# Patient Record
Sex: FEMALE | Race: WHITE | NOT HISPANIC OR LATINO | Employment: FULL TIME | URBAN - NONMETROPOLITAN AREA
[De-identification: names, ages, dates, MRNs, and addresses within clinical notes are randomized per-mention and may not be internally consistent; named-entity substitution may affect disease eponyms.]

---

## 2018-06-25 ENCOUNTER — OFFICE VISIT (OUTPATIENT)
Dept: OBSTETRICS AND GYNECOLOGY | Facility: CLINIC | Age: 28
End: 2018-06-25

## 2018-06-25 VITALS
DIASTOLIC BLOOD PRESSURE: 72 MMHG | BODY MASS INDEX: 25.52 KG/M2 | SYSTOLIC BLOOD PRESSURE: 116 MMHG | HEIGHT: 67 IN | WEIGHT: 162.6 LBS

## 2018-06-25 DIAGNOSIS — F52.0 HYPOACTIVE SEXUAL DESIRE: ICD-10-CM

## 2018-06-25 DIAGNOSIS — N94.10 DYSPAREUNIA, FEMALE: Primary | ICD-10-CM

## 2018-06-25 PROCEDURE — 99203 OFFICE O/P NEW LOW 30 MIN: CPT | Performed by: PHYSICIAN ASSISTANT

## 2018-06-25 RX ORDER — FEXOFENADINE HYDROCHLORIDE 60 MG/1
TABLET, FILM COATED ORAL
COMMUNITY
Start: 2018-06-01

## 2018-06-25 NOTE — PROGRESS NOTES
Subjective   Chief Complaint   Patient presents with   • Dyspareunia     Has always had painful intercourse; decreased libido       Ksaey Mckoy is a 27 y.o. year old new patient  presenting to be seen for complaint of dyspareunia and decreased libido.  She has been  for several months. Her first sexual activity was at age 25 with her now .  She reports having dyspareunia since her first intercourse. Also feels lack of sexual desire.  [place] painful every time with insertion and not usually able to continue intercourse. Has not had any vaginal discharge or irritation and does not feel vaginal dryness an issue.  Denies any history of sexual abuse or trauma in the past. She admits she does not really feel sexually attracted to her . Doesn't feel she has ever felt sexually attracted to any male or female.   Started sprintec for contraception 2017. Doesn't feels oc's have contributed to decreased libido.        History reviewed. No pertinent past medical history.     Current Outpatient Prescriptions:   •  fexofenadine (ALLEGRA) 60 MG tablet, , Disp: , Rfl:   •  SPRINTEC 28 0.25-35 MG-MCG per tablet, , Disp: , Rfl:    Allergies   Allergen Reactions   • Azithromycin Nausea And Vomiting      Past Surgical History:   Procedure Laterality Date   • NO PAST SURGERIES        Social History     Social History   • Marital status:      Spouse name: N/A   • Number of children: N/A   • Years of education: N/A     Occupational History   • Not on file.     Social History Main Topics   • Smoking status: Never Smoker   • Smokeless tobacco: Never Used   • Alcohol use Yes      Comment: 5 per week   • Drug use: No   • Sexual activity: Yes     Partners: Male     Birth control/ protection: OCP     Other Topics Concern   • Not on file     Social History Narrative   • No narrative on file      Family History   Problem Relation Age of Onset   • Diabetes Father    • No Known Problems Mother    •  "Breast cancer Maternal Grandmother    • Meniere's disease Maternal Grandmother    • Hyperlipidemia Maternal Grandmother    • Diabetes Maternal Grandfather    • Cystic fibrosis Maternal Grandfather        Review of Systems   Constitutional: Negative.    HENT: Positive for sinus pressure.    Respiratory: Positive for cough.    Gastrointestinal: Negative.    Genitourinary: Positive for dyspareunia. Negative for dysuria, menstrual problem, pelvic pain and vaginal discharge.   All other systems reviewed and are negative.          Objective   /72   Ht 170.2 cm (67\")   Wt 73.8 kg (162 lb 9.6 oz)   LMP 06/16/2018 (Exact Date)   Breastfeeding? No   BMI 25.47 kg/m²     Physical Exam   Constitutional: She appears well-developed and well-nourished. She is cooperative.   Neck: No thyroid mass and no thyromegaly present.   Cardiovascular: Normal rate, regular rhythm and normal heart sounds.    Pulmonary/Chest: Effort normal and breath sounds normal.   Abdominal: Soft. Normal appearance. There is no tenderness.   Genitourinary: Uterus normal. There is no tenderness or lesion on the right labia. There is no tenderness or lesion on the left labia. Cervix exhibits no motion tenderness and no discharge. Right adnexum displays no mass and no tenderness. Left adnexum displays no mass and no tenderness. No erythema in the vagina. No vaginal discharge found.   Genitourinary Comments: Very difficult for patient to relax for exam. Hymenal ring is thick posteriorly however when patient able to relax there is no difficulty inserting speculum. No vaginal bands or abnormalities noted.   Neurological: She is alert.   Skin: Skin is warm and dry.   Psychiatric: She has a normal mood and affect. Her behavior is normal.            Assessment and Plan  Kasey was seen today for dyspareunia.    Diagnoses and all orders for this visit:    Dyspareunia, female    Hypoactive sexual desire      Patient Instructions   Patient informed that no " physical abnormalities noted except thick posterior vaginal band. While this could be contributing to her dyspareunia when she is able to fully relax I feel she could have successful intercourse. However there could be option to consider surgical relaxation of hymenal band but do not feel that would be next step at this time. Patient counseled in techniques for conscious relaxation of hymenal band and vaginal muscles with sexual activity and also can consider using vaginal dilators to aid with relaxation for next several weeks.  Given her other complaints of not ever feeling sexual desire I feel she would benefit from counseling specific to sexual issues and she is open to this idea. She is informed I will research counselors in [facility] specific to this area and contact her with options.  It is also discussed that it would be appropriate to check hormone levels however she would have to discontinue her oc;s for several weeks and she prefers not to go this route at this time.       30 minutes total time spent face to face with patient with 20 minutes specific to counseling patient regarding above.             This note was electronically signed.    Jerrica Lovelace PA-C   June 26, 2018

## 2018-06-26 NOTE — PATIENT INSTRUCTIONS
Patient informed that no physical abnormalities noted except thick posterior vaginal band. While this could be contributing to her dyspareunia when she is able to fully relax I feel she could have successful intercourse. However there could be option to consider surgical relaxation of hymenal band but do not feel that would be next step at this time. Patient counseled in techniques for conscious relaxation of hymenal band and vaginal muscles with sexual activity and also can consider using vaginal dilators to aid with relaxation for next several weeks.  Given her other complaints of not ever feeling sexual desire I feel she would benefit from counseling specific to sexual issues and she is open to this idea. She is informed I will research counselors in [facility] specific to this area and contact her with options.  It is also discussed that it would be appropriate to check hormone levels however she would have to discontinue her oc;s for several weeks and she prefers not to go this route at this time.       30 minutes total time spent face to face with patient with 20 minutes specific to counseling patient regarding above.

## 2018-12-03 ENCOUNTER — TRANSCRIBE ORDERS (OUTPATIENT)
Dept: ADMINISTRATIVE | Facility: HOSPITAL | Age: 28
End: 2018-12-03

## 2018-12-03 ENCOUNTER — HOSPITAL ENCOUNTER (OUTPATIENT)
Dept: GENERAL RADIOLOGY | Facility: HOSPITAL | Age: 28
Discharge: HOME | End: 2018-12-03
Admitting: NURSE PRACTITIONER

## 2018-12-03 DIAGNOSIS — R05.9 COUGH: Primary | ICD-10-CM

## 2018-12-03 PROCEDURE — 71046 X-RAY EXAM CHEST 2 VIEWS: CPT

## 2021-01-04 ENCOUNTER — IMMUNIZATION (OUTPATIENT)
Dept: VACCINE CLINIC | Facility: HOSPITAL | Age: 31
End: 2021-01-04

## 2021-01-04 PROCEDURE — 91301 HC SARSCO02 VAC 100MCG/0.5ML IM: CPT | Performed by: INTERNAL MEDICINE

## 2021-01-04 PROCEDURE — 0011A: CPT | Performed by: INTERNAL MEDICINE

## 2021-02-01 ENCOUNTER — IMMUNIZATION (OUTPATIENT)
Dept: VACCINE CLINIC | Facility: HOSPITAL | Age: 31
End: 2021-02-01

## 2021-02-01 PROCEDURE — 0012A: CPT | Performed by: INTERNAL MEDICINE

## 2021-02-01 PROCEDURE — 91301 HC SARSCO02 VAC 100MCG/0.5ML IM: CPT | Performed by: INTERNAL MEDICINE

## 2021-08-06 ENCOUNTER — HOSPITAL ENCOUNTER (OUTPATIENT)
Dept: GENERAL RADIOLOGY | Facility: HOSPITAL | Age: 31
Discharge: HOME | End: 2021-08-06
Admitting: NURSE PRACTITIONER

## 2021-08-06 ENCOUNTER — TRANSCRIBE ORDERS (OUTPATIENT)
Dept: GENERAL RADIOLOGY | Facility: HOSPITAL | Age: 31
End: 2021-08-06

## 2021-08-06 DIAGNOSIS — M25.531 RIGHT WRIST PAIN: Primary | ICD-10-CM

## 2021-08-06 DIAGNOSIS — M25.531 RIGHT WRIST PAIN: ICD-10-CM

## 2021-08-06 PROCEDURE — 73100 X-RAY EXAM OF WRIST: CPT

## 2023-08-22 ENCOUNTER — OFFICE VISIT (OUTPATIENT)
Dept: OBSTETRICS AND GYNECOLOGY | Facility: CLINIC | Age: 33
End: 2023-08-22
Payer: COMMERCIAL

## 2023-08-22 VITALS
SYSTOLIC BLOOD PRESSURE: 112 MMHG | WEIGHT: 165.6 LBS | BODY MASS INDEX: 25.99 KG/M2 | HEIGHT: 67 IN | DIASTOLIC BLOOD PRESSURE: 72 MMHG

## 2023-08-22 DIAGNOSIS — N94.10 DYSPAREUNIA IN FEMALE: Primary | ICD-10-CM

## 2023-08-22 PROCEDURE — 99213 OFFICE O/P EST LOW 20 MIN: CPT | Performed by: PHYSICIAN ASSISTANT

## 2023-08-22 NOTE — PATIENT INSTRUCTIONS
Encouraged to try and use estrogen cream twice weekly. Will continue current management as dyspareunia improved  Follow up prn

## 2023-08-22 NOTE — PROGRESS NOTES
Subjective   Chief Complaint   Patient presents with    Follow-up     2 month follow-up dyspareunia, patient states she has had some improvement       Kasey Leelee is a 33 y.o. year old  presenting to be seen for follow up  Seen 2 months ago for vaginal dyspareunia. Had yeast infection which was treated. Also started vaginal estrogen cream as vaginal tissue appeared atrophic  Patient reports seeing quite a bit of improvement but she is not always consistent with using cream twice weekly.  No new complaints or concerns    History reviewed. No pertinent past medical history.     Current Outpatient Medications:     buPROPion XL (WELLBUTRIN XL) 300 MG 24 hr tablet, , Disp: , Rfl:     estradiol (ESTRACE VAGINAL) 0.1 MG/GM vaginal cream, Insert 1 gm intravaginally 2 times each week, Disp: 42.5 g, Rfl: 2    fexofenadine (ALLEGRA) 60 MG tablet, , Disp: , Rfl:     Xulane 150-35 MCG/24HR, , Disp: , Rfl:    Allergies   Allergen Reactions    Azithromycin Nausea And Vomiting      Past Surgical History:   Procedure Laterality Date    NO PAST SURGERIES        Social History     Socioeconomic History    Marital status:    Tobacco Use    Smoking status: Never    Smokeless tobacco: Never   Substance and Sexual Activity    Alcohol use: Not Currently     Comment: 5 per week    Drug use: No    Sexual activity: Yes     Partners: Male     Birth control/protection: Condom, Patch      Family History   Problem Relation Age of Onset    Diabetes Father     No Known Problems Mother     Breast cancer Maternal Grandmother     Meniere's disease Maternal Grandmother     Hyperlipidemia Maternal Grandmother     Diabetes Maternal Grandfather     Cystic fibrosis Maternal Grandfather        Review of Systems   Constitutional:  Negative for chills, diaphoresis and fever.   Gastrointestinal:  Negative for constipation, diarrhea, nausea and vomiting.   Genitourinary:  Negative for difficulty urinating, dysuria, menstrual problem and pelvic  "pain.         Objective   /72   Ht 170.2 cm (67\")   Wt 75.1 kg (165 lb 9.6 oz)   LMP 07/25/2023 (Approximate)   BMI 25.94 kg/mý     Physical Exam  Constitutional:       Appearance: Normal appearance. She is well-developed and well-groomed.   Eyes:      General: Lids are normal.      Extraocular Movements: Extraocular movements intact.      Conjunctiva/sclera: Conjunctivae normal.   Neurological:      General: No focal deficit present.      Mental Status: She is alert and oriented to person, place, and time.   Psychiatric:         Attention and Perception: Attention normal.         Mood and Affect: Mood normal.         Speech: Speech normal.         Behavior: Behavior is cooperative.          Result Review :           NuSwab VG+ - Swab, Vagina (06/22/2023 10:36)         Assessment and Plan  Diagnoses and all orders for this visit:    1. Dyspareunia in female (Primary)      Patient Instructions   Encouraged to try and use estrogen cream twice weekly. Will continue current management as dyspareunia improved  Follow up prn           This note was electronically signed.    Jerrica Lovelace PA-C   August 22, 2023  "

## 2024-08-23 ENCOUNTER — OFFICE VISIT (OUTPATIENT)
Dept: NEUROLOGY | Facility: CLINIC | Age: 34
End: 2024-08-23
Payer: COMMERCIAL

## 2024-08-23 VITALS
HEART RATE: 115 BPM | TEMPERATURE: 98.7 F | OXYGEN SATURATION: 95 % | HEIGHT: 67 IN | BODY MASS INDEX: 29 KG/M2 | DIASTOLIC BLOOD PRESSURE: 78 MMHG | SYSTOLIC BLOOD PRESSURE: 128 MMHG | WEIGHT: 184.8 LBS

## 2024-08-23 DIAGNOSIS — R00.0 TACHYCARDIA: ICD-10-CM

## 2024-08-23 DIAGNOSIS — R40.4 TRANSIENT ALTERATION OF AWARENESS: Primary | ICD-10-CM

## 2024-08-23 DIAGNOSIS — R56.9 WITNESSED SEIZURE-LIKE ACTIVITY: ICD-10-CM

## 2024-08-23 PROCEDURE — 99204 OFFICE O/P NEW MOD 45 MIN: CPT | Performed by: NURSE PRACTITIONER

## 2024-08-23 RX ORDER — MONTELUKAST SODIUM 10 MG/1
10 TABLET ORAL NIGHTLY
COMMUNITY

## 2024-08-23 RX ORDER — ATOMOXETINE 40 MG/1
40 CAPSULE ORAL DAILY
COMMUNITY
Start: 2024-02-12

## 2024-08-23 RX ORDER — OLOPATADINE HYDROCHLORIDE AND MOMETASONE FUROATE 25; 665 UG/1; UG/1
SPRAY, METERED NASAL
COMMUNITY
Start: 2024-07-10

## 2024-08-23 RX ORDER — LEVOCETIRIZINE DIHYDROCHLORIDE 5 MG/1
5 TABLET, FILM COATED ORAL EVERY EVENING
COMMUNITY

## 2024-08-23 NOTE — PROGRESS NOTES
"     New Patient Office Visit      Patient Name: Kasey Mckoy  : 1990   MRN: [id number]     Referring Physician: Enma Colunga*    Chief Complaint:    Chief Complaint   Patient presents with    Seizures     NP/Pt is here for seizures. On  patient reports a event that last 5 to 10 seconds of blank stare and reports fatigue for a few days following the event.        History of Present Illness: Kasey Mckoy is a 34 y.o. female who is here today to establish care with Neurology for an episode of transient alteration of awareness.  She is accompanied by her , Josue, today.  She was at home on 2024 and got up to head to bed, from the couch, and she doesn't remember anything after that.  The episode was witnessed by her  and he says she started to stand up and then slumped back into the couch and she had a blank look on her face, arms raised to shoulder height, and then she sat up and came back to and was confused.  She says she remembers her ears ringing and she could hear her  calling for her.  She did bite the inside of her cheek; no incontinence.  States, \"I just felt wrong and a general blah feeling after that\".  She doesn't remember being excessively sleepy that day.  She denies any history of seizures.  Says she has had episodes of passing out previously but but those episodes were not seizure-like.  She denies any known history of head injury.  She graduated from college.  She thinks her birth was normal as far as she knows- full term.  She denies illegal drug use or vaping; drinks alcohol rarely.  She does mention she has noticed she is not able to stand as long as she use to.     Subjective      Review of Systems:   Review of Systems   Neurological:  Positive for syncope.       Past Medical History: History reviewed. No pertinent past medical history.    Past Surgical History:   Past Surgical History:   Procedure Laterality Date    NO PAST SURGERIES         Family " "History:   Family History   Problem Relation Age of Onset    Diabetes Father     No Known Problems Mother     Breast cancer Maternal Grandmother     Meniere's disease Maternal Grandmother     Hyperlipidemia Maternal Grandmother     Diabetes Maternal Grandfather     Cystic fibrosis Maternal Grandfather        Social History:   Social History     Socioeconomic History    Marital status:    Tobacco Use    Smoking status: Never    Smokeless tobacco: Never   Substance and Sexual Activity    Alcohol use: Not Currently     Comment: 5 per week    Drug use: No    Sexual activity: Yes     Partners: Male     Birth control/protection: Condom, Patch       Medications:     Current Outpatient Medications:     atomoxetine (STRATTERA) 40 MG capsule, Take 1 capsule by mouth Daily., Disp: , Rfl:     estradiol (ESTRACE VAGINAL) 0.1 MG/GM vaginal cream, Insert 1 gm intravaginally 2 times each week, Disp: 42.5 g, Rfl: 2    levocetirizine (XYZAL) 5 MG tablet, Take 1 tablet by mouth Every Evening., Disp: , Rfl:     montelukast (SINGULAIR) 10 MG tablet, Take 1 tablet by mouth Every Night., Disp: , Rfl:     Ryaltris 665-25 MCG/ACT suspension, INSTILL 1-2 SPRAYS IN EACH NOSTRIL ONCE OR TWICE DAILY, Disp: , Rfl:     Xulane 150-35 MCG/24HR, , Disp: , Rfl:     Allergies:   Allergies   Allergen Reactions    Azithromycin Nausea And Vomiting       Objective     Physical Exam:  Vital Signs:   Vitals:    08/23/24 1314   BP: 128/78   Pulse: 115   Temp: 98.7 °F (37.1 °C)   SpO2: 95%   Weight: 83.8 kg (184 lb 12.8 oz)   Height: 170.2 cm (67\")   PainSc: 0-No pain     Body mass index is 28.94 kg/m².     Physical Exam  Vitals and nursing note reviewed.   Constitutional:       General: She is not in acute distress.     Appearance: Normal appearance. She is well-developed. She is not diaphoretic.   HENT:      Head: Normocephalic and atraumatic.   Eyes:      Extraocular Movements: Extraocular movements intact.      Conjunctiva/sclera: Conjunctivae " normal.      Pupils: Pupils are equal, round, and reactive to light.   Pulmonary:      Effort: Pulmonary effort is normal. No respiratory distress.   Musculoskeletal:         General: Normal range of motion.   Skin:     General: Skin is warm and dry.   Neurological:      Mental Status: She is alert and oriented to person, place, and time.      Cranial Nerves: Cranial nerves 2-12 are intact.      Coordination: Finger-Nose-Finger Test normal.      Gait: Gait is intact.   Psychiatric:         Mood and Affect: Mood normal.         Behavior: Behavior normal.         Thought Content: Thought content normal.         Judgment: Judgment normal.         Neurologic Exam     Mental Status   Oriented to person, place, and time.   Level of consciousness: alert    Cranial Nerves   Cranial nerves II through XII intact.     CN II   Visual fields full to confrontation.     CN III, IV, VI   Pupils are equal, round, and reactive to light.  CN III: no CN III palsy  CN VI: no CN VI palsy  Nystagmus: none     CN V   Facial sensation intact.     CN VII   Facial expression full, symmetric.     CN IX, X   CN IX normal.   CN X normal.     CN XI   CN XI normal.     CN XII   CN XII normal.     Motor Exam   Muscle bulk: normal  Overall muscle tone: normal    Sensory Exam   Light touch normal.   Proprioception normal.     Gait, Coordination, and Reflexes     Gait  Gait: normal    Coordination   Finger to nose coordination: normal    Tremor   Resting tremor: absent  Intention tremor: absent  Action tremor: absent      Assessment / Plan      Assessment/Plan:   Diagnoses and all orders for this visit:    1. Transient alteration of awareness (Primary)  -     EEG; Future  -     MRI Brain With & Without Contrast; Future  -     Ambulatory Referral to Cardiology    2. Witnessed seizure-like activity  -     EEG; Future  -     MRI Brain With & Without Contrast; Future    3. Tachycardia  -     Ambulatory Referral to Cardiology      *Patient education on  Seizures and Syncope provided today. I have discussed seizure precautions with patient. Advised patient per [place] driving laws- no driving for 90 days following a seizure. 911 should be called for any seizure lasting more than 5 minutes.   *Referral to Cardiology for possible syncopal episodes and tachycardia.       Follow Up:   Return in about 3 months (around 11/23/2024) for Follow Up.    JOSE ALBERTO Main, FNP-C  [facility]

## 2024-08-29 ENCOUNTER — PATIENT ROUNDING (BHMG ONLY) (OUTPATIENT)
Dept: NEUROLOGY | Facility: CLINIC | Age: 34
End: 2024-08-29
Payer: COMMERCIAL

## 2024-08-29 NOTE — PROGRESS NOTES
[facility]     Outpatient New Patient / Consult Note    Kasey Mckoy  [id number]  2024    Referred By: Sonali Griffin*    Chief Complaint   Patient presents with    Seizures    Extremity Weakness       Subjective     History of Present Illness:   Kasey Mckoy is a 34 y.o. female history of significant allergies who presents to the Cardiology Clinic for evaluation of tachycardia and Transient alteration of awareness .  Patient was referred by her neurologist after recently losing consciousness.  Patient says she was sitting on the couch with her , playing on her phone, when she suddenly lost consciousness.  The next thing she remembers is waking up to her , panicked, calling for help.  She reportedly had jerking of her arms and flung her falling off the couch.  She did reportedly bite the inside of her cheek.  She had another episode while standing, cooking in the kitchen where she felt weak and dizzy and felt like she was going to pass out so she sat down and the symptoms eventually resolved.  She did get short of breath but does not report any significant palpitations or chest pain.  She has had at least 3 episodes of syncope in her lifetime but all of them were usually explained by either a very hot environment or poor food/water intake.  She is relatively sedentary at baseline but does some videogame exercises and has had no significant exertional limitation.  Does get a little short of breath but nothing that is unexpected with exercise.  Does not smoke cigarettes or use drugs.  Drinks alcohol socially.    Past Cardiac Testin. Last Coronary Angio: NA  2. Last Echo: NA   3. Prior Stress Testing: NA  4. Prior Holter Monitor: NA    Review of Systems:  Review of Systems   Respiratory: Negative.     Cardiovascular: Negative.    Gastrointestinal: Negative.    Musculoskeletal: Negative.    Neurological:  Positive for dizziness, seizures, syncope and  "weakness. Negative for light-headedness.       No past medical history on file.    Past Surgical History:   Procedure Laterality Date    NO PAST SURGERIES         Family History   Problem Relation Age of Onset    Arrhythmia Mother     Diabetes Father     Breast cancer Maternal Grandmother     Meniere's disease Maternal Grandmother     Hyperlipidemia Maternal Grandmother     Diabetes Maternal Grandfather     Cystic fibrosis Maternal Grandfather     Cardiomyopathy Maternal Grandfather     Atrial fibrillation Maternal Grandfather        Social History     Socioeconomic History    Marital status:    Tobacco Use    Smoking status: Never    Smokeless tobacco: Never   Substance and Sexual Activity    Alcohol use: Not Currently     Comment: 5 per week    Drug use: No    Sexual activity: Yes     Partners: Male     Birth control/protection: Condom, Patch         Current Outpatient Medications:     atomoxetine (STRATTERA) 40 MG capsule, Take 1 capsule by mouth Daily., Disp: , Rfl:     estradiol (ESTRACE VAGINAL) 0.1 MG/GM vaginal cream, Insert 1 gm intravaginally 2 times each week, Disp: 42.5 g, Rfl: 2    levocetirizine (XYZAL) 5 MG tablet, Take 1 tablet by mouth Every Evening., Disp: , Rfl:     montelukast (SINGULAIR) 10 MG tablet, Take 1 tablet by mouth Every Night., Disp: , Rfl:     Ryaltris 665-25 MCG/ACT suspension, prn, Disp: , Rfl:     Xulane 150-35 MCG/24HR, , Disp: , Rfl:     Allergies   Allergen Reactions    Azithromycin Nausea And Vomiting          Objective     Vitals:  Vitals:    08/30/24 0955 08/30/24 1019 08/30/24 1020 08/30/24 1021   BP: 98/64 92/68 94/72 94/78   BP Location: Right arm Right arm Right arm Right arm   Patient Position: Sitting Lying Sitting Standing   Cuff Size: Adult Adult Adult Adult   Pulse: 111 101 112 110   SpO2: 97% 98% 98% 97%   Weight: 82.8 kg (182 lb 9.6 oz)      Height: 170.2 cm (67\")          Physical Exam:  Vitals reviewed.   Constitutional:       Appearance: Healthy " appearance. Not in distress.   Neck:      Vascular: No JVD.   Pulmonary:      Effort: Pulmonary effort is normal.      Breath sounds: Normal breath sounds.   Cardiovascular:      Normal rate. Regular rhythm. Normal S1. Normal S2.       Murmurs: There is no murmur.      No gallop.  No click. No rub.   Pulses:     Intact distal pulses.   Edema:     Peripheral edema absent.   Abdominal:      General: There is no distension.      Palpations: Abdomen is soft.      Tenderness: There is no abdominal tenderness.   Skin:     General: Skin is warm and dry.         Results Review:   I reviewed the patient's new clinical results.  I reviewed the patient's new imaging results and agree with the interpretation.  I reviewed the patient's other test results and agree with the interpretation  I personally viewed and interpreted the patient's EKG/Telemetry data      ECG 12 Lead    Date/Time: 8/30/2024 11:33 AM  Performed by: John Mack MD    Authorized by: John Mack MD  Comparison: not compared with previous ECG   Previous ECG: no previous ECG available  Rhythm: sinus rhythm  Rate: normal  BPM: 95  Conduction: conduction normal  ST Segments: ST segments normal  T Waves: T waves normal  QRS axis: normal  Other: no other findings    Clinical impression: normal ECG             Assessment & Plan   Diagnoses and all orders for this visit:    1. Syncope and collapse (Primary)  2. Dyspnea on exertion  Episode of loss of consciousness occurred at rest and was associated with seizure-like symptoms.  Repeat episode may have been a vagal response.  Patient has had syncope in the past but has been explained by dehydration/overheating.  Clinical exam is unremarkable.  No signs of heart failure.  Less likely she would have significant vascular or heart disease at this age.  ECG is normal.  Less likely she would have conduction disease at her age as well.  Current dyspnea on exertion occurs when she is exercising.  Orthostatics today  were negative.  -- Will proceed with 30-day MCOT since symptoms are quite rare and have not recurred since initial event  -- Ordering echo to evaluate baseline cardiac structure and function  -- Ordering basic labs to screen for metabolic abnormalities  -- Recommend that she complete workup with neurology as this sounds to be the more likely etiology              Plan   Orders Placed This Encounter   Procedures    TSH Rfx On Abnormal To Free T4     Standing Status:   Future     Number of Occurrences:   1     Standing Expiration Date:   8/30/2025     Order Specific Question:   Release to patient     Answer:   Routine Release [[id number]]    Hemoglobin A1c     Standing Status:   Future     Number of Occurrences:   1     Standing Expiration Date:   8/30/2025     Order Specific Question:   Release to patient     Answer:   Routine Release [[id number]]    CBC (No Diff)     Standing Status:   Future     Number of Occurrences:   1     Standing Expiration Date:   8/30/2025     Order Specific Question:   Release to patient     Answer:   Routine Release [[id number]]    Comprehensive Metabolic Panel     Standing Status:   Future     Number of Occurrences:   1     Standing Expiration Date:   8/30/2025     Order Specific Question:   Release to patient     Answer:   Routine Release [[id number]]    Magnesium     Standing Status:   Future     Number of Occurrences:   1     Standing Expiration Date:   8/30/2025     Order Specific Question:   Release to patient     Answer:   Routine Release [[id number]]    Lipid Panel     Standing Status:   Future     Number of Occurrences:   1     Standing Expiration Date:   8/30/2025     Order Specific Question:   Release to patient     Answer:   Routine Release [[id number]]    Mobile Cardiac Outpatient Telemetry     Standing Status:   Future     Number of Occurrences:   1     Standing Expiration Date:   8/30/2025     Order Specific Question:   Reason for exam?     Answer:   Presyncope or Syncope      Order Specific Question:   Release to patient     Answer:   Routine Release [[id number]]    ECG 12 Lead     This order was created via procedure documentation     Order Specific Question:   Release to patient     Answer:   Routine Release [[id number]]    Adult Transthoracic Echo Complete W/ Cont if Necessary Per Protocol     Standing Status:   Future     Standing Expiration Date:   8/30/2025     Order Specific Question:   Reason for exam?     Answer:   Syncope     Order Specific Question:   Release to patient     Answer:   Routine Release [[id number]]     Medications:    Preventative Cardiology:   Tobacco Cessation: N/A  Obstructive Sleep Apnea Screening: Deffered  AAA Screening: Deffered  Peripheral Arterial Disease Screening: Deffered        Follow Up:   Return in about 2 months (around 10/30/2024).    Thank you for allowing me to participate in the care of your patient. Please to not hesitate to contact me with additional questions or concerns.    John Mack MD  08/30/2024   11:12 EDT

## 2024-08-30 ENCOUNTER — LAB (OUTPATIENT)
Dept: LAB | Facility: HOSPITAL | Age: 34
End: 2024-08-30
Payer: COMMERCIAL

## 2024-08-30 ENCOUNTER — OFFICE VISIT (OUTPATIENT)
Dept: CARDIOLOGY | Facility: CLINIC | Age: 34
End: 2024-08-30
Payer: COMMERCIAL

## 2024-08-30 VITALS
SYSTOLIC BLOOD PRESSURE: 94 MMHG | HEART RATE: 110 BPM | HEIGHT: 67 IN | DIASTOLIC BLOOD PRESSURE: 78 MMHG | BODY MASS INDEX: 28.66 KG/M2 | WEIGHT: 182.6 LBS | OXYGEN SATURATION: 97 %

## 2024-08-30 DIAGNOSIS — R55 SYNCOPE AND COLLAPSE: Primary | ICD-10-CM

## 2024-08-30 DIAGNOSIS — R06.09 DYSPNEA ON EXERTION: ICD-10-CM

## 2024-08-30 DIAGNOSIS — R55 SYNCOPE AND COLLAPSE: ICD-10-CM

## 2024-08-30 LAB
ALBUMIN SERPL-MCNC: 4.1 G/DL (ref 3.5–5.2)
ALBUMIN/GLOB SERPL: 1.3 G/DL
ALP SERPL-CCNC: 74 U/L (ref 39–117)
ALT SERPL W P-5'-P-CCNC: 15 U/L (ref 1–33)
ANION GAP SERPL CALCULATED.3IONS-SCNC: 11 MMOL/L (ref 5–15)
AST SERPL-CCNC: 20 U/L (ref 1–32)
BILIRUB SERPL-MCNC: 0.3 MG/DL (ref 0–1.2)
BUN SERPL-MCNC: 10 MG/DL (ref 6–20)
BUN/CREAT SERPL: 14.7 (ref 7–25)
CALCIUM SPEC-SCNC: 9.8 MG/DL (ref 8.6–10.5)
CHLORIDE SERPL-SCNC: 104 MMOL/L (ref 98–107)
CHOLEST SERPL-MCNC: 117 MG/DL (ref 0–200)
CO2 SERPL-SCNC: 22 MMOL/L (ref 22–29)
CREAT SERPL-MCNC: 0.68 MG/DL (ref 0.57–1)
DEPRECATED RDW RBC AUTO: 41.7 FL (ref 37–54)
EGFRCR SERPLBLD CKD-EPI 2021: 117.4 ML/MIN/1.73
ERYTHROCYTE [DISTWIDTH] IN BLOOD BY AUTOMATED COUNT: 11.9 % (ref 12.3–15.4)
GLOBULIN UR ELPH-MCNC: 3.1 GM/DL
GLUCOSE SERPL-MCNC: 85 MG/DL (ref 65–99)
HBA1C MFR BLD: 5.1 % (ref 4.8–5.6)
HCT VFR BLD AUTO: 41.5 % (ref 34–46.6)
HDLC SERPL-MCNC: 67 MG/DL (ref 40–60)
HGB BLD-MCNC: 14.5 G/DL (ref 12–15.9)
LDLC SERPL CALC-MCNC: 36 MG/DL (ref 0–100)
LDLC/HDLC SERPL: 0.54 {RATIO}
MAGNESIUM SERPL-MCNC: 2.1 MG/DL (ref 1.6–2.6)
MCH RBC QN AUTO: 33.6 PG (ref 26.6–33)
MCHC RBC AUTO-ENTMCNC: 34.9 G/DL (ref 31.5–35.7)
MCV RBC AUTO: 96.3 FL (ref 79–97)
PLATELET # BLD AUTO: 373 10*3/MM3 (ref 140–450)
PMV BLD AUTO: 9.4 FL (ref 6–12)
POTASSIUM SERPL-SCNC: 4.3 MMOL/L (ref 3.5–5.2)
PROT SERPL-MCNC: 7.2 G/DL (ref 6–8.5)
RBC # BLD AUTO: 4.31 10*6/MM3 (ref 3.77–5.28)
SODIUM SERPL-SCNC: 137 MMOL/L (ref 136–145)
TRIGL SERPL-MCNC: 68 MG/DL (ref 0–150)
TSH SERPL DL<=0.05 MIU/L-ACNC: 1.16 UIU/ML (ref 0.27–4.2)
VLDLC SERPL-MCNC: 14 MG/DL (ref 5–40)
WBC NRBC COR # BLD AUTO: 8.89 10*3/MM3 (ref 3.4–10.8)

## 2024-08-30 PROCEDURE — 80050 GENERAL HEALTH PANEL: CPT

## 2024-08-30 PROCEDURE — 83036 HEMOGLOBIN GLYCOSYLATED A1C: CPT

## 2024-08-30 PROCEDURE — 83735 ASSAY OF MAGNESIUM: CPT

## 2024-08-30 PROCEDURE — 36415 COLL VENOUS BLD VENIPUNCTURE: CPT

## 2024-08-30 PROCEDURE — 80061 LIPID PANEL: CPT

## 2024-10-03 ENCOUNTER — HOSPITAL ENCOUNTER (OUTPATIENT)
Dept: SLEEP MEDICINE | Facility: HOSPITAL | Age: 34
Discharge: HOME | End: 2024-10-03
Admitting: NURSE PRACTITIONER
Payer: COMMERCIAL

## 2024-10-03 ENCOUNTER — HOSPITAL ENCOUNTER (OUTPATIENT)
Dept: MRI IMAGING | Facility: HOSPITAL | Age: 34
Discharge: HOME | End: 2024-10-03
Payer: COMMERCIAL

## 2024-10-03 DIAGNOSIS — R56.9 WITNESSED SEIZURE-LIKE ACTIVITY: ICD-10-CM

## 2024-10-03 DIAGNOSIS — R40.4 TRANSIENT ALTERATION OF AWARENESS: ICD-10-CM

## 2024-10-03 PROCEDURE — 0 GADOBENATE DIMEGLUMINE 529 MG/ML SOLUTION: Performed by: NURSE PRACTITIONER

## 2024-10-03 PROCEDURE — 70553 MRI BRAIN STEM W/O & W/DYE: CPT

## 2024-10-03 PROCEDURE — A9577 INJ MULTIHANCE: HCPCS | Performed by: NURSE PRACTITIONER

## 2024-10-03 PROCEDURE — 95816 EEG AWAKE AND DROWSY: CPT

## 2024-10-03 RX ADMIN — GADOBENATE DIMEGLUMINE 15 ML: 529 INJECTION, SOLUTION INTRAVENOUS at 09:00

## 2024-10-28 NOTE — PROGRESS NOTES
[facility] Cardiology Office Follow Up Note    Kasey Mckoy  [id number]  10/30/2024    Primary Care Provider: Dorcas Solo APRN    Chief Complaint   Patient presents with    Follow-up    Syncope and collapse       Subjective     History of Present Illness:   Kasey Mckoy is a 34 y.o. female history of syncope and possible seizures who presents to the Cardiology Clinic for follow up of cardiac testing.  During her last visit, we ordered an echo and cardiac monitor due to her history of transient loss of consciousness.  Overall, this was felt to be mostly neurologic.  Possible contribution from vagal stimulation but no clear-cut cardiac cause.  Since her last visit, patient has had no complaints.  No cardiac symptoms.  No repeat syncope.  No chest pain, dyspnea, orthopnea, or leg swelling.  Overall, says she is feeling well.  Does not really exercise outside of her normal daily activities.    Past Cardiac Testin. Last Coronary Angio: NA    2. Last Echo: 10/17/24    Left ventricular systolic function is normal. Left ventricular ejection fraction appears to be 61 - 65%. Left ventricular diastolic function was normal.    Normal right ventricular size and function.    Trace to mild tricuspid valve regurgitation is present. Estimated right ventricular systolic pressure from tricuspid regurgitation is normal (<35 mmHg).    3. Prior Stress Testing: NA    4. Prior Holter Monitor: 24  Overall, low risk cardiac monitor.  There were 0 patient triggered/symptomatic events.  No malignant arrhythmias or blocks detected.    No ectopy detected.  Normal heart rate variability.     Review of Systems:  Review of Systems   Constitutional: Negative.    Respiratory: Negative.     Cardiovascular: Negative.    Gastrointestinal: Negative.    Musculoskeletal: Negative.    Neurological: Negative.        I have reviewed and/or updated the patient's past medical, past surgical, family, social history, problem list  "and allergies as appropriate.       Current Outpatient Medications:     atomoxetine (STRATTERA) 40 MG capsule, Take 1 capsule by mouth Daily., Disp: , Rfl:     estradiol (ESTRACE VAGINAL) 0.1 MG/GM vaginal cream, Insert 1 gm intravaginally 2 times each week, Disp: 42.5 g, Rfl: 2    levocetirizine (XYZAL) 5 MG tablet, Take 1 tablet by mouth Every Evening., Disp: , Rfl:     montelukast (SINGULAIR) 10 MG tablet, Take 1 tablet by mouth Every Night., Disp: , Rfl:     Ryaltris 665-25 MCG/ACT suspension, prn, Disp: , Rfl:     Xulane 150-35 MCG/24HR, , Disp: , Rfl:        Objective     Vitals:  Vitals:    10/30/24 0925   BP: 110/68   BP Location: Left arm   Patient Position: Sitting   Pulse: 91   SpO2: 100%   Weight: 85.3 kg (188 lb)   Height: 170.2 cm (67.01\")       Physical Exam:  Vitals reviewed.   Constitutional:       Appearance: Healthy appearance. Not in distress.   Cardiovascular:      Normal rate. Regular rhythm. Normal S1. Normal S2.       Murmurs: There is no murmur.      No gallop.  No click. No rub.   Pulses:     Intact distal pulses.   Edema:     Peripheral edema absent.   Skin:     General: Skin is warm and dry.         Results Review:   I reviewed the patient's new clinical results.  I reviewed the patient's new imaging results and agree with the interpretation.  I reviewed the patient's other test results and agree with the interpretation    Procedures        Assessment & Plan   Diagnoses and all orders for this visit:    1. Syncope and collapse (Primary)  Loss of consciousness episode occurred at rest, associated with seizure-like symptoms.  Repeat episode may have had a vagal element.  Previous syncopal episodes explained by dehydration/overheating.  Orthostatics were negative.  ECG normal.  MCOT was normal with no evidence of arrhythmias or blocks  Echo was also normal.  Exam remains normal.  -- At this point, no indication for further testing as symptoms are most likely of neurologic origin.  Should " continue follow-up with neurology.    2. Valvular regurgitation  Echocardiogram was quite normal.  Had trace to mild TR which is probably physiologic.  No murmur on exam today.  No indication for surveillance echocardiography unless patient develops new symptoms in the future.  Patient will call back as needed if new symptoms ever arise, especially with exertion.              Plan   No orders of the defined types were placed in this encounter.    Medications:    Preventative Cardiology:   Tobacco Cessation: N/A  Obstructive Sleep Apnea Screening: Deffered  AAA Screening: Deffered  Peripheral Arterial Disease Screening: Deffered       Follow Up:   Return if symptoms worsen or fail to improve.    Thank you for allowing me to participate in the care of your patient. Please to not hesitate to contact me with additional questions or concerns.     John Mack MD  10/30/2024  09:36 EDT

## 2024-10-30 ENCOUNTER — OFFICE VISIT (OUTPATIENT)
Dept: CARDIOLOGY | Facility: CLINIC | Age: 34
End: 2024-10-30
Payer: COMMERCIAL

## 2024-10-30 VITALS
SYSTOLIC BLOOD PRESSURE: 110 MMHG | DIASTOLIC BLOOD PRESSURE: 68 MMHG | WEIGHT: 188 LBS | OXYGEN SATURATION: 100 % | BODY MASS INDEX: 29.51 KG/M2 | HEART RATE: 91 BPM | HEIGHT: 67 IN

## 2024-10-30 DIAGNOSIS — I38 VALVULAR REGURGITATION: Chronic | ICD-10-CM

## 2024-10-30 DIAGNOSIS — R55 SYNCOPE AND COLLAPSE: Primary | Chronic | ICD-10-CM

## 2024-10-30 PROCEDURE — 99214 OFFICE O/P EST MOD 30 MIN: CPT | Performed by: INTERNAL MEDICINE

## 2024-11-25 ENCOUNTER — OFFICE VISIT (OUTPATIENT)
Dept: NEUROLOGY | Facility: CLINIC | Age: 34
End: 2024-11-25
Payer: COMMERCIAL

## 2024-11-25 VITALS
HEART RATE: 110 BPM | WEIGHT: 189 LBS | BODY MASS INDEX: 29.66 KG/M2 | HEIGHT: 67 IN | TEMPERATURE: 97.1 F | DIASTOLIC BLOOD PRESSURE: 78 MMHG | SYSTOLIC BLOOD PRESSURE: 110 MMHG | OXYGEN SATURATION: 96 %

## 2024-11-25 DIAGNOSIS — R40.4 TRANSIENT ALTERATION OF AWARENESS: Primary | ICD-10-CM

## 2024-11-25 PROCEDURE — 99213 OFFICE O/P EST LOW 20 MIN: CPT | Performed by: NURSE PRACTITIONER

## 2024-11-25 NOTE — PROGRESS NOTES
"     Follow Up Office Visit      Patient Name: Kasey Mckoy  : 1990   MRN: [id number]     Chief Complaint:    Chief Complaint   Patient presents with    Follow-up     3M F/U    Seizures     Patient states no recent SZ. Patient states she's doing pretty good, no incidents recently.        History of Present Illness: Kasey Mckoy is a 34 y.o. female who is here today to follow up and was last seen on 2024.  She is unaccompanied today.  She had had no episodes of loss of time or witnessed seizure activity since her last visit.  Visit with cardiology was noncontributory.  She does mention when she had the previous episode she was under an increased amount of stress.   *EEG on 10/3/2024 was normal; MRI brain with and without contrast was noncontributory.     Following taken from previous visit note:  Kasey Mckoy is a 34 y.o. female who is here today to establish care with Neurology for an episode of transient alteration of awareness.  She is accompanied by her , Josue, today.  She was at home on 2024 and got up to head to bed, from the couch, and she doesn't remember anything after that.  The episode was witnessed by her  and he says she started to stand up and then slumped back into the couch and she had a blank look on her face, arms raised to shoulder height, and then she sat up and came back to and was confused.  She says she remembers her ears ringing and she could hear her  calling for her.  She did bite the inside of her cheek; no incontinence.  States, \"I just felt wrong and a general blah feeling after that\".  She doesn't remember being excessively sleepy that day.  She denies any history of seizures.  Says she has had episodes of passing out previously but but those episodes were not seizure-like.  She denies any known history of head injury.  She graduated from college.  She thinks her birth was normal as far as she knows- full term.  She denies illegal drug use or " "vaping; drinks alcohol rarely.  She does mention she has noticed she is not able to stand as long as she use to.      Subjective      Review of Systems:   Review of Systems   Neurological:  Negative for seizures and syncope.       I have reviewed and the following portions of the patient's history were updated as appropriate: past family history, past medical history, past social history, past surgical history and problem list.    Medications:     Current Outpatient Medications:     atomoxetine (STRATTERA) 40 MG capsule, Take 1 capsule by mouth Daily., Disp: , Rfl:     estradiol (ESTRACE VAGINAL) 0.1 MG/GM vaginal cream, Insert 1 gm intravaginally 2 times each week, Disp: 42.5 g, Rfl: 2    levocetirizine (XYZAL) 5 MG tablet, Take 1 tablet by mouth Every Evening., Disp: , Rfl:     montelukast (SINGULAIR) 10 MG tablet, Take 1 tablet by mouth Every Night., Disp: , Rfl:     Ryaltris 665-25 MCG/ACT suspension, prn, Disp: , Rfl:     Xulane 150-35 MCG/24HR, , Disp: , Rfl:     Allergies:   Allergies   Allergen Reactions    Azithromycin Nausea And Vomiting       Objective     Physical Exam:  Vital Signs:   Vitals:    11/25/24 1336   BP: 110/78   Pulse: 110   Temp: 97.1 °F (36.2 °C)   SpO2: 96%   Weight: 85.7 kg (189 lb)   Height: 170.2 cm (67.01\")   PainSc: 0-No pain     Body mass index is 29.59 kg/m².    Physical Exam  Vitals and nursing note reviewed.   Constitutional:       General: She is not in acute distress.     Appearance: Normal appearance. She is well-developed. She is not diaphoretic.   HENT:      Head: Normocephalic and atraumatic.   Eyes:      Extraocular Movements: Extraocular movements intact.      Conjunctiva/sclera: Conjunctivae normal.   Pulmonary:      Effort: Pulmonary effort is normal. No respiratory distress.   Musculoskeletal:         General: Normal range of motion.   Skin:     General: Skin is dry.   Neurological:      Mental Status: She is alert and oriented to person, place, and time. "   Psychiatric:         Mood and Affect: Mood normal.         Behavior: Behavior normal.         Thought Content: Thought content normal.         Judgment: Judgment normal.         Neurological Exam  Mental Status  Alert. Oriented to person, place, and time.    Cranial Nerves  CN III, IV, VI: Extraocular movements intact bilaterally.        Assessment / Plan      Assessment/Plan:   Diagnoses and all orders for this visit:    1. Transient alteration of awareness (Primary)    *I have advised patient to RTC as needed.      Follow Up:   Return if symptoms worsen or fail to improve.    JOSE ALBERTO Main, FNP-C  [facility]

## 2025-03-26 ENCOUNTER — TELEPHONE (OUTPATIENT)
Dept: NEUROLOGY | Facility: CLINIC | Age: 35
End: 2025-03-26

## 2025-03-26 NOTE — TELEPHONE ENCOUNTER
She was instructed to return as needed so she does need to schedule a follow up visit with me to discuss further. Thanks.

## 2025-03-26 NOTE — TELEPHONE ENCOUNTER
Provider: KAREN HICKMAN    Caller: Kasey Mckoy    Relationship to Patient: Self    Phone Number: [phone]    Reason for Call: STATES SHE HAD ANOTHER BLACKOUT EPISODE THAT MAY HAVE LASTED A COUPLE MINUTES. STATES SHE FELT THIS ON COMING ON WHICH HASN'T HAPPENED BEFORE. WAS LIGHTHEADED AND HAD RINGING IN HER EARS BEFORE SHE BLACKED OUT. PLEASE REVIEW & ADVISE, THANK YOU.

## 2025-03-31 ENCOUNTER — OFFICE VISIT (OUTPATIENT)
Dept: NEUROLOGY | Facility: CLINIC | Age: 35
End: 2025-03-31
Payer: COMMERCIAL

## 2025-03-31 VITALS
TEMPERATURE: 98.2 F | OXYGEN SATURATION: 99 % | WEIGHT: 193.8 LBS | HEART RATE: 91 BPM | HEIGHT: 67 IN | BODY MASS INDEX: 30.42 KG/M2 | DIASTOLIC BLOOD PRESSURE: 60 MMHG | SYSTOLIC BLOOD PRESSURE: 100 MMHG

## 2025-03-31 DIAGNOSIS — R55 SYNCOPE AND COLLAPSE: ICD-10-CM

## 2025-03-31 DIAGNOSIS — R40.4 TRANSIENT ALTERATION OF AWARENESS: Primary | ICD-10-CM

## 2025-03-31 PROCEDURE — 99214 OFFICE O/P EST MOD 30 MIN: CPT | Performed by: NURSE PRACTITIONER

## 2025-03-31 NOTE — PROGRESS NOTES
Follow Up Office Visit      Patient Name: Kasey Mckoy  : 1990   MRN: [id number]     Chief Complaint:    Chief Complaint   Patient presents with   • Follow-up   • Transient aleration of awareness      Pt states on  she was making dinner and she felt really dizzy and she sat on the floor and when she woke up she was face down on the floor.        History of Present Illness: Kasey Mckoy is a 34 y.o. female who is here today to follow up and was last seen on 2024.  She is accompanied by her , Doni, again today.  She was instructed to return to the clinic PRN and she has had another episode of loss of consciousness, after her last visit.  Several days ago she was standing in the kitchen and began to feel dizzy so she sat down on the ground and then came to face down on the floor.  She had a headache after the episode and didn't feel well.  It took her a day to feel normal and back to her baseline.  Her  was in the next room so the episode was unwitnessed.  The episode may have lasted less than a minute.  Her  says she made groaning sound as she was coming to.  No incontinence or tongue bites during the episode.   *MRI brain on 10/3/2024 was normal; EEG on 10/3/2024 was normal.     Following taken from the past couple of visit notes:  Kasey Mckoy is a 34 y.o. female who is here today to follow up and was last seen on 2024.  She is unaccompanied today.  She had had no episodes of loss of time or witnessed seizure activity since her last visit.  Visit with cardiology was noncontributory.  She does mention when she had the previous episode she was under an increased amount of stress.   *EEG on 10/3/2024 was normal; MRI brain with and without contrast was noncontributory.      Following taken from previous visit note:  Kasey Mckoy is a 34 y.o. female who is here today to establish care with Neurology for an episode of transient alteration of awareness.  She is  "accompanied by her , Josue, today.  She was at home on 8/4/2024 and got up to head to bed, from the couch, and she doesn't remember anything after that.  The episode was witnessed by her  and he says she started to stand up and then slumped back into the couch and she had a blank look on her face, arms raised to shoulder height, and then she sat up and came back to and was confused.  She says she remembers her ears ringing and she could hear her  calling for her.  She did bite the inside of her cheek; no incontinence.  States, \"I just felt wrong and a general blah feeling after that\".  She doesn't remember being excessively sleepy that day.  She denies any history of seizures.  Says she has had episodes of passing out previously but but those episodes were not seizure-like.  She denies any known history of head injury.  She graduated from college.  She thinks her birth was normal as far as she knows- full term.  She denies illegal drug use or vaping; drinks alcohol rarely.  She does mention she has noticed she is not able to stand as long as she use to.     Subjective      Review of Systems:   Review of Systems    I have reviewed and the following portions of the patient's history were updated as appropriate: past family history, past medical history, past social history, past surgical history and problem list.    Medications:     Current Outpatient Medications:   •  atomoxetine (STRATTERA) 40 MG capsule, Take 1 capsule by mouth Daily., Disp: , Rfl:   •  estradiol (ESTRACE VAGINAL) 0.1 MG/GM vaginal cream, Insert 1 gm intravaginally 2 times each week, Disp: 42.5 g, Rfl: 2  •  levocetirizine (XYZAL) 5 MG tablet, Take 1 tablet by mouth Every Evening., Disp: , Rfl:   •  montelukast (SINGULAIR) 10 MG tablet, Take 1 tablet by mouth Every Night., Disp: , Rfl:   •  Ryaltris 665-25 MCG/ACT suspension, prn, Disp: , Rfl:   •  Xulane 150-35 MCG/24HR, , Disp: , Rfl:     Allergies:   Allergies   Allergen " "Reactions   • Azithromycin Nausea And Vomiting       Objective     Physical Exam:  Vital Signs:   Vitals:    03/31/25 0845   BP: 100/60   BP Location: Left arm   Patient Position: Sitting   Cuff Size: Adult   Pulse: 91   Temp: 98.2 °F (36.8 °C)   TempSrc: Temporal   SpO2: 99%   Weight: 87.9 kg (193 lb 12.8 oz)   Height: 170.2 cm (67.01\")   PainSc: 0-No pain     Body mass index is 30.35 kg/m².    Physical Exam  Vitals and nursing note reviewed.   Constitutional:       General: She is not in acute distress.     Appearance: Normal appearance. She is well-developed. She is not diaphoretic.   HENT:      Head: Normocephalic and atraumatic.   Eyes:      Extraocular Movements: Extraocular movements intact.      Conjunctiva/sclera: Conjunctivae normal.      Pupils: Pupils are equal, round, and reactive to light.   Pulmonary:      Effort: Pulmonary effort is normal. No respiratory distress.   Musculoskeletal:         General: Normal range of motion.   Skin:     General: Skin is warm and dry.   Neurological:      General: No focal deficit present.      Mental Status: She is alert and oriented to person, place, and time.      Cranial Nerves: Cranial nerves 2-12 are intact.      Sensory: Sensation is intact.      Motor: Motor function is intact.      Coordination: Coordination is intact.      Gait: Gait is intact.   Psychiatric:         Mood and Affect: Mood normal.         Behavior: Behavior normal.         Thought Content: Thought content normal.         Judgment: Judgment normal.         Neurological Exam  Mental Status  Alert. Oriented to person, place, and time.    Cranial Nerves  CN III, IV, VI: Extraocular movements intact bilaterally. Pupils equal round and reactive to light bilaterally.    Sensory  Normal sensation.    Coordination    Finger-to-nose, rapid alternating movements and heel-to-shin normal bilaterally without dysmetria.    Gait   Normal gait.      Assessment / Plan      Assessment/Plan:   Diagnoses and all " orders for this visit:    1. Transient alteration of awareness (Primary)  -     Ambulatory Referral to Neurology    2. Syncope and collapse  -     Tilt Table; Future  -     Ambulatory Referral to Neurology    *Patient education on Syncope and Seizure provided today.   *Safety precautions discussed and encouraged- no driving for 90 days, 911 to be called for any seizure like activity lasting for more than 5 minutes.  *She is agreeable to referral to Dr. Munoz, epileptologist, for further evaluation- she may need an EMU visit to rule out seizure disorder.   *She is agreeable to a tilt table test.     Follow Up:   Return in about 3 months (around 6/30/2025) for Follow Up.    JOSE ALBERTO Main, FNP-C  [facility] Neurology and Sleep Medicine

## 2025-04-18 ENCOUNTER — HOSPITAL ENCOUNTER (OUTPATIENT)
Dept: CARDIOLOGY | Facility: HOSPITAL | Age: 35
Discharge: HOME | End: 2025-04-18
Admitting: INTERNAL MEDICINE
Payer: COMMERCIAL

## 2025-04-18 DIAGNOSIS — R55 SYNCOPE AND COLLAPSE: ICD-10-CM

## 2025-04-18 PROCEDURE — 93660 TILT TABLE EVALUATION: CPT

## 2025-07-14 ENCOUNTER — OFFICE VISIT (OUTPATIENT)
Dept: NEUROLOGY | Facility: CLINIC | Age: 35
End: 2025-07-14
Payer: COMMERCIAL

## 2025-07-14 VITALS
HEIGHT: 67 IN | DIASTOLIC BLOOD PRESSURE: 58 MMHG | HEART RATE: 109 BPM | TEMPERATURE: 97.3 F | BODY MASS INDEX: 30.95 KG/M2 | WEIGHT: 197.2 LBS | OXYGEN SATURATION: 98 % | SYSTOLIC BLOOD PRESSURE: 90 MMHG

## 2025-07-14 DIAGNOSIS — R56.9 WITNESSED SEIZURE-LIKE ACTIVITY: ICD-10-CM

## 2025-07-14 DIAGNOSIS — R55 SYNCOPE AND COLLAPSE: ICD-10-CM

## 2025-07-14 DIAGNOSIS — R40.4 TRANSIENT ALTERATION OF AWARENESS: Primary | ICD-10-CM

## 2025-07-14 PROCEDURE — 99213 OFFICE O/P EST LOW 20 MIN: CPT | Performed by: NURSE PRACTITIONER

## 2025-07-14 NOTE — PROGRESS NOTES
Follow Up Office Visit      Patient Name: Kasey Mckoy  : 1990   MRN: [id number]     Chief Complaint:    Chief Complaint   Patient presents with    Transient alteration of awareness      Pt states she hasn't had any more episodes. Seen Dr. Munoz on 25       History of Present Illness: Kasey Mckoy is a 34 y.o. female who is here today to follow up and was last seen on 3/31/2025.  She is accompanied by her  again today.  She hasn't had any further episodes of seizure like activity or loss of time.  Most recent episode was in 2025.  She did have a visit and EMU visit with Dr. Munoz since her previous visit.  She feels she has been doing well- working on decreasing her stress level at work.   *Visit note with Dr. Munoz and EMU visit note reviewed at time of visit.   *She has another tilt table test scheduled next month (ordered by Dr. Munoz).      Following taken from previous visit note:  Kasey Mckoy is a 34 y.o. female who is here today to follow up and was last seen on 2024.  She is accompanied by her , Doni, again today.  She was instructed to return to the clinic PRN and she has had another episode of loss of consciousness, after her last visit.  Several days ago she was standing in the kitchen and began to feel dizzy so she sat down on the ground and then came to face down on the floor.  She had a headache after the episode and didn't feel well.  It took her a day to feel normal and back to her baseline.  Her  was in the next room so the episode was unwitnessed.  The episode may have lasted less than a minute.  Her  says she made groaning sound as she was coming to.  No incontinence or tongue bites during the episode.   *MRI brain on 10/3/2024 was normal; EEG on 10/3/2024 was normal.     Subjective      Review of Systems:   Review of Systems   Neurological:  Negative for seizures and syncope.       I have reviewed and the following  "portions of the patient's history were updated as appropriate: past family history, past medical history, past social history, past surgical history and problem list.    Medications:     Current Outpatient Medications:     atomoxetine (STRATTERA) 40 MG capsule, Take 1 capsule by mouth Daily., Disp: , Rfl:     estradiol (ESTRACE VAGINAL) 0.1 MG/GM vaginal cream, Insert 1 gm intravaginally 2 times each week, Disp: 42.5 g, Rfl: 2    levocetirizine (XYZAL) 5 MG tablet, Take 1 tablet by mouth Every Evening., Disp: , Rfl:     montelukast (SINGULAIR) 10 MG tablet, Take 1 tablet by mouth Every Night., Disp: , Rfl:     Ryaltris 665-25 MCG/ACT suspension, prn, Disp: , Rfl:     Xulane 150-35 MCG/24HR, , Disp: , Rfl:     Allergies:   Allergies   Allergen Reactions    Azithromycin Nausea And Vomiting       Objective     Physical Exam:  Vital Signs:   Vitals:    07/14/25 0827   BP: 90/58   Pulse: 109   Temp: 97.3 °F (36.3 °C)   SpO2: 98%   Weight: 89.4 kg (197 lb 3.2 oz)   Height: 170.2 cm (67.01\")   PainSc: 0-No pain     Body mass index is 30.88 kg/m².    Physical Exam  Vitals and nursing note reviewed.   Constitutional:       General: She is not in acute distress.     Appearance: Normal appearance. She is well-developed. She is obese. She is not diaphoretic.   HENT:      Head: Normocephalic and atraumatic.   Eyes:      Extraocular Movements: Extraocular movements intact.      Conjunctiva/sclera: Conjunctivae normal.   Pulmonary:      Effort: Pulmonary effort is normal. No respiratory distress.   Musculoskeletal:         General: Normal range of motion.   Skin:     General: Skin is dry.   Neurological:      Mental Status: She is alert and oriented to person, place, and time.   Psychiatric:         Mood and Affect: Mood normal.         Behavior: Behavior normal.         Thought Content: Thought content normal.         Judgment: Judgment normal.         Neurological Exam  Mental Status  Alert. Oriented to person, place, and " time.    Cranial Nerves  CN III, IV, VI: Extraocular movements intact bilaterally.        Assessment / Plan      Assessment/Plan:   Diagnoses and all orders for this visit:    1. Transient alteration of awareness (Primary)    2. Syncope and collapse    3. Witnessed seizure-like activity    *Seizure precautions discussed and encouraged- she is aware of [place] driving laws regarding seizures, 911 to be called for any seizure activity lasting more than 5 minutes or for any cluster seizure activity.   *Encouraged her to keep her scheduled procedure appointment for tilt table and follow up with Dr. Munoz as scheduled.      Follow Up:   Return in about 6 months (around 1/14/2026) for Follow Up.      Sonali ABRAMS, FNP-C  [facility] Neurology and Sleep Medicine